# Patient Record
Sex: FEMALE | Race: BLACK OR AFRICAN AMERICAN | NOT HISPANIC OR LATINO | URBAN - METROPOLITAN AREA
[De-identification: names, ages, dates, MRNs, and addresses within clinical notes are randomized per-mention and may not be internally consistent; named-entity substitution may affect disease eponyms.]

---

## 2023-12-28 ENCOUNTER — HOSPITAL ENCOUNTER (EMERGENCY)
Facility: HOSPITAL | Age: 43
Discharge: HOME | End: 2023-12-28
Attending: EMERGENCY MEDICINE
Payer: COMMERCIAL

## 2023-12-28 VITALS
TEMPERATURE: 98.6 F | BODY MASS INDEX: 21.66 KG/M2 | HEIGHT: 65 IN | HEART RATE: 80 BPM | WEIGHT: 130 LBS | DIASTOLIC BLOOD PRESSURE: 102 MMHG | OXYGEN SATURATION: 100 % | RESPIRATION RATE: 18 BRPM | SYSTOLIC BLOOD PRESSURE: 146 MMHG

## 2023-12-28 DIAGNOSIS — L02.811 ABSCESS OF HEAD: Primary | ICD-10-CM

## 2023-12-28 PROCEDURE — 2500000001 HC RX 250 WO HCPCS SELF ADMINISTERED DRUGS (ALT 637 FOR MEDICARE OP): Performed by: EMERGENCY MEDICINE

## 2023-12-28 PROCEDURE — 99283 EMERGENCY DEPT VISIT LOW MDM: CPT | Performed by: EMERGENCY MEDICINE

## 2023-12-28 PROCEDURE — 10060 I&D ABSCESS SIMPLE/SINGLE: CPT | Performed by: EMERGENCY MEDICINE

## 2023-12-28 RX ORDER — ACETAMINOPHEN 325 MG/1
650 TABLET ORAL ONCE
Status: COMPLETED | OUTPATIENT
Start: 2023-12-28 | End: 2023-12-28

## 2023-12-28 RX ORDER — CLINDAMYCIN HYDROCHLORIDE 300 MG/1
300 CAPSULE ORAL ONCE
Status: COMPLETED | OUTPATIENT
Start: 2023-12-28 | End: 2023-12-28

## 2023-12-28 RX ORDER — CLINDAMYCIN HYDROCHLORIDE 300 MG/1
300 CAPSULE ORAL 3 TIMES DAILY
Qty: 15 CAPSULE | Refills: 0 | Status: SHIPPED | OUTPATIENT
Start: 2023-12-28 | End: 2024-01-02

## 2023-12-28 RX ADMIN — ACETAMINOPHEN 650 MG: 325 TABLET ORAL at 16:45

## 2023-12-28 RX ADMIN — CLINDAMYCIN HYDROCHLORIDE 300 MG: 300 CAPSULE ORAL at 16:45

## 2023-12-28 ASSESSMENT — COLUMBIA-SUICIDE SEVERITY RATING SCALE - C-SSRS
6. HAVE YOU EVER DONE ANYTHING, STARTED TO DO ANYTHING, OR PREPARED TO DO ANYTHING TO END YOUR LIFE?: NO
1. IN THE PAST MONTH, HAVE YOU WISHED YOU WERE DEAD OR WISHED YOU COULD GO TO SLEEP AND NOT WAKE UP?: NO
2. HAVE YOU ACTUALLY HAD ANY THOUGHTS OF KILLING YOURSELF?: NO

## 2023-12-28 ASSESSMENT — PAIN DESCRIPTION - DESCRIPTORS: DESCRIPTORS: PRESSURE

## 2023-12-28 ASSESSMENT — PAIN DESCRIPTION - LOCATION: LOCATION: EAR

## 2023-12-28 ASSESSMENT — PAIN - FUNCTIONAL ASSESSMENT: PAIN_FUNCTIONAL_ASSESSMENT: 0-10

## 2023-12-28 ASSESSMENT — PAIN DESCRIPTION - ORIENTATION: ORIENTATION: RIGHT

## 2023-12-28 ASSESSMENT — PAIN DESCRIPTION - PAIN TYPE: TYPE: ACUTE PAIN

## 2023-12-28 ASSESSMENT — PAIN SCALES - GENERAL: PAINLEVEL_OUTOF10: 8

## 2023-12-28 NOTE — ED PROVIDER NOTES
HPI   Chief Complaint   Patient presents with    Earache       43-year-old female presents with a chief complaint of right ear pain and possible infection.  States she had a surgery around her right ear back when she was 5 years old.  Uncertain of the specific procedure but believes may be some type of cyst or lesion removal.  States she recurrently has some odd sensation or discomfort around the scar tissue.  Now for the last 4 to 5 days she has had an area of swelling just behind the ear and associated with her scar tissue.  This is painful.  Hearing is otherwise okay.  No fevers.  No drainage.  No recent illnesses otherwise or other complaints.                        [place] Coma Scale Score: 15                  Patient History   History reviewed. No pertinent past medical history.  History reviewed. No pertinent surgical history.  No family history on file.  Social History     Tobacco Use    Smoking status: Never    Smokeless tobacco: Never   Substance Use Topics    Alcohol use: Not on file    Drug use: Not on file       Physical Exam   ED Triage Vitals [12/28/23 1559]   Temp Heart Rate Resp BP   37 °C (98.6 °F) 80 18 (!) 146/102      SpO2 Temp Source Heart Rate Source Patient Position   100 % Oral -- --      BP Location FiO2 (%)     -- --       Physical Exam  General: Awake, alert, oriented and appears uncomfortable, not overtly ill sitting upright  HEENT: NCAT, posterior to the right ear is a cutaneous area of fluctuance and minimal surrounding erythema, no crepitus, no mastoid tenderness to percussion, pinna otherwise unremarkable, TM and external canal clear, MM moist, oropharynx clear, and neck is supple appreciable cervical lymphadenopathy  Ext: WWP, cap refill < 2 sec  Skin: Otherwise dry without rash  Neuro: CN 2 - 12 grossly intact, motor and sensation symmetric and intact throughout    ED Course & MDM   Diagnoses as of 12/28/23 1640   Abscess of head       Medical Decision Making  43-year-old female  presents reporting history of prior facial and ear surgery with residual scar tissue.  Now, for the last several days, an area of swelling behind the right ear.  No systemic complaints.  Exam well, afebrile, lamination of the ear in question reveals a cutaneous fluctuance consistent with abscess, cyst, or other.  There is no appreciable cervical lymphadenopathy otherwise, no aura ear involvement, no suggestion of mastoiditis, and at this point I can identify no other complicating factors.    After discussion with the patient, we proceeded with local anesthetic and needle aspiration.  Please see procedure note below.  Tolerated well by patient, the area in question has been decompressed.  Given sensation, will initiate antibiotic therapy, recommended warm compresses, close outpatient observation.  ENT follow-up recommended.  Strict return instructions were also provided.  She is understanding and in agreement with this plan.        Procedure  Incision and Drainage    Performed by: Russ Herrera MD  Authorized by: Russ Herrera MD    Consent:     Consent obtained:  Verbal    Consent given by:  Patient    Risks, benefits, and alternatives were discussed: yes    Location:     Type:  Abscess    Location:  Head    Head location:  Scalp  Pre-procedure details:     Skin preparation:  Chlorhexidine with alcohol  Anesthesia:     Anesthesia method:  Local infiltration    Local anesthetic:  Lidocaine 1% w/o epi  Procedure type:     Complexity:  Simple  Procedure details:     Needle aspiration: yes      Needle size:  22 G  Post-procedure details:     Procedure completion:  Tolerated  Comments:      Area decompressed, small amount of purulent fluid expressed       Russ Herrera MD  12/28/23 1640

## 2025-06-28 ENCOUNTER — HOSPITAL ENCOUNTER (EMERGENCY)
Facility: HOSPITAL | Age: 45
Discharge: HOME | End: 2025-06-28
Payer: COMMERCIAL

## 2025-06-28 VITALS
RESPIRATION RATE: 15 BRPM | HEART RATE: 74 BPM | HEIGHT: 64 IN | TEMPERATURE: 99.1 F | WEIGHT: 137.13 LBS | OXYGEN SATURATION: 99 % | BODY MASS INDEX: 23.41 KG/M2 | DIASTOLIC BLOOD PRESSURE: 99 MMHG | SYSTOLIC BLOOD PRESSURE: 120 MMHG

## 2025-06-28 DIAGNOSIS — R53.83 OTHER FATIGUE: Primary | ICD-10-CM

## 2025-06-28 DIAGNOSIS — N39.0 URINARY TRACT INFECTION WITHOUT HEMATURIA, SITE UNSPECIFIED: ICD-10-CM

## 2025-06-28 LAB
ALBUMIN SERPL BCP-MCNC: 4.5 G/DL (ref 3.4–5)
ALP SERPL-CCNC: 62 U/L (ref 33–110)
ALT SERPL W P-5'-P-CCNC: 12 U/L (ref 7–45)
ANION GAP SERPL CALCULATED.3IONS-SCNC: 9 MMOL/L (ref 10–20)
APPEARANCE UR: ABNORMAL
AST SERPL W P-5'-P-CCNC: 15 U/L (ref 9–39)
BACTERIA #/AREA URNS AUTO: ABNORMAL /HPF
BASOPHILS # BLD AUTO: 0.02 X10*3/UL (ref 0–0.1)
BASOPHILS NFR BLD AUTO: 0.3 %
BILIRUB SERPL-MCNC: 0.8 MG/DL (ref 0–1.2)
BILIRUB UR STRIP.AUTO-MCNC: NEGATIVE MG/DL
BUN SERPL-MCNC: 13 MG/DL (ref 6–23)
CALCIUM SERPL-MCNC: 9 MG/DL (ref 8.6–10.3)
CHLORIDE SERPL-SCNC: 106 MMOL/L (ref 98–107)
CO2 SERPL-SCNC: 26 MMOL/L (ref 21–32)
COLOR UR: YELLOW
CREAT SERPL-MCNC: 0.89 MG/DL (ref 0.5–1.05)
EGFRCR SERPLBLD CKD-EPI 2021: 83 ML/MIN/1.73M*2
EOSINOPHIL # BLD AUTO: 0.11 X10*3/UL (ref 0–0.7)
EOSINOPHIL NFR BLD AUTO: 1.5 %
ERYTHROCYTE [DISTWIDTH] IN BLOOD BY AUTOMATED COUNT: 12.6 % (ref 11.5–14.5)
GLUCOSE SERPL-MCNC: 106 MG/DL (ref 74–99)
GLUCOSE UR STRIP.AUTO-MCNC: NORMAL MG/DL
HCT VFR BLD AUTO: 38.6 % (ref 36–46)
HGB BLD-MCNC: 12.5 G/DL (ref 12–16)
IMM GRANULOCYTES # BLD AUTO: 0.01 X10*3/UL (ref 0–0.7)
IMM GRANULOCYTES NFR BLD AUTO: 0.1 % (ref 0–0.9)
KETONES UR STRIP.AUTO-MCNC: ABNORMAL MG/DL
LEUKOCYTE ESTERASE UR QL STRIP.AUTO: ABNORMAL
LYMPHOCYTES # BLD AUTO: 1.56 X10*3/UL (ref 1.2–4.8)
LYMPHOCYTES NFR BLD AUTO: 20.7 %
MCH RBC QN AUTO: 31.3 PG (ref 26–34)
MCHC RBC AUTO-ENTMCNC: 32.4 G/DL (ref 32–36)
MCV RBC AUTO: 97 FL (ref 80–100)
MONOCYTES # BLD AUTO: 0.44 X10*3/UL (ref 0.1–1)
MONOCYTES NFR BLD AUTO: 5.8 %
MUCOUS THREADS #/AREA URNS AUTO: ABNORMAL /LPF
NEUTROPHILS # BLD AUTO: 5.41 X10*3/UL (ref 1.2–7.7)
NEUTROPHILS NFR BLD AUTO: 71.6 %
NITRITE UR QL STRIP.AUTO: ABNORMAL
NRBC BLD-RTO: 0 /100 WBCS (ref 0–0)
PH UR STRIP.AUTO: 6.5 [PH]
PLATELET # BLD AUTO: 352 X10*3/UL (ref 150–450)
POTASSIUM SERPL-SCNC: 4.2 MMOL/L (ref 3.5–5.3)
PROT SERPL-MCNC: 8 G/DL (ref 6.4–8.2)
PROT UR STRIP.AUTO-MCNC: ABNORMAL MG/DL
RBC # BLD AUTO: 4 X10*6/UL (ref 4–5.2)
RBC # UR STRIP.AUTO: ABNORMAL MG/DL
RBC #/AREA URNS AUTO: ABNORMAL /HPF
SODIUM SERPL-SCNC: 137 MMOL/L (ref 136–145)
SP GR UR STRIP.AUTO: 1.02
SQUAMOUS #/AREA URNS AUTO: ABNORMAL /HPF
UROBILINOGEN UR STRIP.AUTO-MCNC: NORMAL MG/DL
WBC # BLD AUTO: 7.6 X10*3/UL (ref 4.4–11.3)
WBC #/AREA URNS AUTO: ABNORMAL /HPF

## 2025-06-28 PROCEDURE — 85025 COMPLETE CBC W/AUTO DIFF WBC: CPT | Performed by: STUDENT IN AN ORGANIZED HEALTH CARE EDUCATION/TRAINING PROGRAM

## 2025-06-28 PROCEDURE — 2500000004 HC RX 250 GENERAL PHARMACY W/ HCPCS (ALT 636 FOR OP/ED): Performed by: PHYSICIAN ASSISTANT

## 2025-06-28 PROCEDURE — 87086 URINE CULTURE/COLONY COUNT: CPT | Mod: TRILAB | Performed by: STUDENT IN AN ORGANIZED HEALTH CARE EDUCATION/TRAINING PROGRAM

## 2025-06-28 PROCEDURE — 36415 COLL VENOUS BLD VENIPUNCTURE: CPT | Performed by: STUDENT IN AN ORGANIZED HEALTH CARE EDUCATION/TRAINING PROGRAM

## 2025-06-28 PROCEDURE — 84075 ASSAY ALKALINE PHOSPHATASE: CPT | Performed by: STUDENT IN AN ORGANIZED HEALTH CARE EDUCATION/TRAINING PROGRAM

## 2025-06-28 PROCEDURE — 81001 URINALYSIS AUTO W/SCOPE: CPT | Performed by: STUDENT IN AN ORGANIZED HEALTH CARE EDUCATION/TRAINING PROGRAM

## 2025-06-28 PROCEDURE — 96375 TX/PRO/DX INJ NEW DRUG ADDON: CPT

## 2025-06-28 PROCEDURE — 99284 EMERGENCY DEPT VISIT MOD MDM: CPT | Mod: 25

## 2025-06-28 PROCEDURE — 96365 THER/PROPH/DIAG IV INF INIT: CPT

## 2025-06-28 RX ORDER — CEPHALEXIN 500 MG/1
500 CAPSULE ORAL 3 TIMES DAILY
Qty: 30 CAPSULE | Refills: 0 | Status: SHIPPED | OUTPATIENT
Start: 2025-06-28 | End: 2025-07-08

## 2025-06-28 RX ORDER — CEFTRIAXONE 1 G/50ML
1 INJECTION, SOLUTION INTRAVENOUS ONCE
Status: COMPLETED | OUTPATIENT
Start: 2025-06-28 | End: 2025-06-28

## 2025-06-28 RX ORDER — ONDANSETRON HYDROCHLORIDE 2 MG/ML
4 INJECTION, SOLUTION INTRAVENOUS ONCE
Status: COMPLETED | OUTPATIENT
Start: 2025-06-28 | End: 2025-06-28

## 2025-06-28 RX ORDER — ONDANSETRON 4 MG/1
4 TABLET, FILM COATED ORAL EVERY 6 HOURS
Qty: 12 TABLET | Refills: 0 | Status: SHIPPED | OUTPATIENT
Start: 2025-06-28 | End: 2025-07-01

## 2025-06-28 RX ORDER — KETOROLAC TROMETHAMINE 15 MG/ML
15 INJECTION, SOLUTION INTRAMUSCULAR; INTRAVENOUS ONCE
Status: COMPLETED | OUTPATIENT
Start: 2025-06-28 | End: 2025-06-28

## 2025-06-28 RX ORDER — NAPROXEN 500 MG/1
500 TABLET ORAL
Qty: 30 TABLET | Refills: 0 | Status: SHIPPED | OUTPATIENT
Start: 2025-06-28 | End: 2025-07-13

## 2025-06-28 RX ADMIN — SODIUM CHLORIDE 1000 ML: 900 INJECTION, SOLUTION INTRAVENOUS at 22:11

## 2025-06-28 RX ADMIN — ONDANSETRON 4 MG: 2 INJECTION INTRAMUSCULAR; INTRAVENOUS at 22:11

## 2025-06-28 RX ADMIN — KETOROLAC TROMETHAMINE 15 MG: 15 INJECTION, SOLUTION INTRAMUSCULAR; INTRAVENOUS at 22:11

## 2025-06-28 RX ADMIN — CEFTRIAXONE 1 G: 1 INJECTION, SOLUTION INTRAVENOUS at 22:11

## 2025-06-28 ASSESSMENT — PAIN DESCRIPTION - PAIN TYPE: TYPE: ACUTE PAIN

## 2025-06-28 ASSESSMENT — PAIN DESCRIPTION - LOCATION
LOCATION: ARM
LOCATION_2: LEG

## 2025-06-28 ASSESSMENT — PAIN SCALES - GENERAL
PAINLEVEL_OUTOF10: 0 - NO PAIN
PAINLEVEL_OUTOF10: 6
PAINLEVEL_OUTOF10: 6

## 2025-06-28 ASSESSMENT — PAIN DESCRIPTION - ORIENTATION
ORIENTATION: LEFT;RIGHT
ORIENTATION_2: RIGHT;LEFT

## 2025-06-28 ASSESSMENT — PAIN - FUNCTIONAL ASSESSMENT
PAIN_FUNCTIONAL_ASSESSMENT: 0-10
PAIN_FUNCTIONAL_ASSESSMENT: 0-10

## 2025-06-28 ASSESSMENT — PAIN DESCRIPTION - FREQUENCY: FREQUENCY: CONSTANT/CONTINUOUS

## 2025-06-28 ASSESSMENT — PAIN DESCRIPTION - DESCRIPTORS
DESCRIPTORS: ACHING
DESCRIPTORS_2: ACHING

## 2025-06-29 NOTE — ED PROVIDER NOTES
HPI   Chief Complaint   Patient presents with    Fatigue     Reports fatigue, body aches, congestion for 4 days now, denies any fevers. Alert and oriented X 4.        HPI        Patient History   Medical History[1]  Surgical History[2]  Family History[3]  Social History[4]    Physical Exam   ED Triage Vitals   Temperature Heart Rate Respirations BP   06/28/25 2019 06/28/25 2019 06/28/25 2019 06/28/25 2023   37.3 °C (99.1 °F) 91 18 (!) 143/98      Pulse Ox Temp Source Heart Rate Source Patient Position   06/28/25 2019 06/28/25 2019 06/28/25 2019 06/28/25 2019   100 % Tympanic Monitor Sitting      BP Location FiO2 (%)     06/28/25 2019 --     Right arm        Physical Exam      ED Course & MDM   Diagnoses as of 06/28/25 2212   Other fatigue   Urinary tract infection without hematuria, site unspecified                 No data recorded     [place] Coma Scale Score: 15 (06/28/25 2051 : Jamia Kate RN)                           Medical Decision Making      Procedure  Procedures       [1] No past medical history on file.  [2] No past surgical history on file.  [3] No family history on file.  [4]   Social History  Tobacco Use    Smoking status: Never    Smokeless tobacco: Never   Substance Use Topics    Alcohol use: Not on file    Drug use: Not on file                      No data recorded     [place] Coma Scale Score: 15 (06/28/25 2051 : Jamia Kate RN)                           Medical Decision Making  I have seen and evaluated this patient.  Physician available for consultation.  Vital signs have been reviewed.  All laboratory and diagnostic imaging is reviewed by myself and interpreted by myself unless otherwise stated.  Additionally imaging is interpreted by radiologist.    CBC without significant leukocytosis or anemia, metabolic panel without significant renal impairment or electrolyte abnormality.  Evidence of urinary tract infection.  Treated with antibiotic.  Patient released in stable condition from emergent standpoint with outpatient follow-up.    Labs Reviewed  URINE CULTURE - Abnormal     Urine Culture                   (*)               COMPREHENSIVE METABOLIC PANEL - Abnormal     Glucose                       106 (*)                Sodium                        137                    Potassium                     4.2                    Chloride                      106                    Bicarbonate                   26                     Anion Gap                     9 (*)                  Urea Nitrogen                 13                     Creatinine                    0.89                   eGFR                          83                     Calcium                       9.0                    Albumin                       4.5                    Alkaline Phosphatase          62                     Total Protein                 8.0                    AST                           15                     Bilirubin, Total              0.8                    ALT                           12                  URINALYSIS WITH REFLEX CULTURE AND MICROSCOPIC - Abnormal     Color, Urine                  Yellow                 Appearance, Urine             Turbid (*)               Specific Gravity, Urine       1.025                  pH, Urine                      6.5                    Protein, Urine                10 (TRACE)                Glucose, Urine                Normal                 Blood, Urine                  0.1 (1+) (*)               Ketones, Urine                20 (1+) (*)               Bilirubin, Urine              NEGATIVE                Urobilinogen, Urine           Normal                 Nitrite, Urine                2+ (*)                 Leukocyte Esterase, Urine     250 Nellie/uL (*)            MICROSCOPIC ONLY, URINE - Abnormal     WBC, Urine                    21-50 (*)               RBC, Urine                    6-10 (*)               Squamous Epithelial Cells, Urine   10-25 (FEW)                Bacteria, Urine               1+ (*)                 Mucus, Urine                  2+                  CBC WITH AUTO DIFFERENTIAL     WBC                           7.6                    nRBC                          0.0                    RBC                           4.00                   Hemoglobin                    12.5                   Hematocrit                    38.6                   MCV                           97                     MCH                           31.3                   MCHC                          32.4                   RDW                           12.6                   Platelets                     352                    Neutrophils %                 71.6                   Immature Granulocytes %, Automated   0.1                    Lymphocytes %                 20.7                   Monocytes %                   5.8                    Eosinophils %                 1.5                    Basophils %                   0.3                    Neutrophils Absolute          5.41                   Immature Granulocytes Absolute, Au*   0.01                   Lymphocytes Absolute          1.56                   Monocytes Absolute            0.44                   Eosinophils Absolute          0.11                    Basophils Absolute            0.02                URINALYSIS WITH REFLEX CULTURE AND MICROSCOPIC       Narrative: The following orders were created for panel order Urinalysis with Reflex Culture and Microscopic.                Procedure                               Abnormality         Status                                   ---------                               -----------         ------                                   Urinalysis with Reflex C...[[id number]]  Abnormal            Final result                             Extra Urine Gray Tube[[id number]]                                                                                     Please view results for these tests on the individual orders.  EXTRA URINE GRAY TUBE  No orders to display  Medications  ketorolac (Toradol) injection 15 mg (15 mg intravenous Given 6/28/25 2211)  sodium chloride 0.9 % bolus 1,000 mL (0 mL intravenous Stopped 6/28/25 2304)   ondansetron (Zofran) injection 4 mg (4 mg intravenous Given 6/28/25 2211)  cefTRIAXone (Rocephin) 1 g in dextrose (iso) IV 50 mL (0 g intravenous Stopped 6/28/25 2242)  Discharge Medication List as of 6/28/2025 10:12 PM    START taking these medications    cephalexin (Keflex) 500 mg capsule  Take 1 capsule (500 mg) by mouth 3 times a day for 10 days., Starting Sat 6/28/2025, Until Tue 7/8/2025, Normal    naproxen (Naprosyn) 500 mg tablet  Take 1 tablet (500 mg) by mouth 2 times daily (morning and late afternoon) for 15 days., Starting Sat 6/28/2025, Until Sun 7/13/2025, Normal    ondansetron (Zofran) 4 mg tablet  Take 1 tablet (4 mg) by mouth every 6 hours for 3 days., Starting Sat 6/28/2025, Until Tue 7/1/2025, Normal                  Procedure  Procedures         [1] No past medical history on file.  [2] No past surgical history on file.  [3] No family history on file.  [4]   Social History  Tobacco Use    Smoking status: Never    Smokeless tobacco: Never   Substance Use Topics    Alcohol use: Not on file     Drug use: Not on file        Gregory Reynolds PA-C  07/02/25 0922

## 2025-07-01 LAB — BACTERIA UR CULT: ABNORMAL
